# Patient Record
Sex: MALE | NOT HISPANIC OR LATINO | URBAN - METROPOLITAN AREA
[De-identification: names, ages, dates, MRNs, and addresses within clinical notes are randomized per-mention and may not be internally consistent; named-entity substitution may affect disease eponyms.]

---

## 2021-12-10 ENCOUNTER — OUTPATIENT (OUTPATIENT)
Dept: OUTPATIENT SERVICES | Facility: HOSPITAL | Age: 61
LOS: 1 days | End: 2021-12-10

## 2021-12-10 VITALS
TEMPERATURE: 97 F | RESPIRATION RATE: 16 BRPM | HEART RATE: 96 BPM | HEIGHT: 66 IN | DIASTOLIC BLOOD PRESSURE: 70 MMHG | WEIGHT: 149.91 LBS | OXYGEN SATURATION: 97 % | SYSTOLIC BLOOD PRESSURE: 110 MMHG

## 2021-12-10 DIAGNOSIS — Z98.890 OTHER SPECIFIED POSTPROCEDURAL STATES: Chronic | ICD-10-CM

## 2021-12-10 DIAGNOSIS — B20 HUMAN IMMUNODEFICIENCY VIRUS [HIV] DISEASE: ICD-10-CM

## 2021-12-10 DIAGNOSIS — N18.6 END STAGE RENAL DISEASE: ICD-10-CM

## 2021-12-10 DIAGNOSIS — E11.9 TYPE 2 DIABETES MELLITUS WITHOUT COMPLICATIONS: ICD-10-CM

## 2021-12-10 DIAGNOSIS — Z91.89 OTHER SPECIFIED PERSONAL RISK FACTORS, NOT ELSEWHERE CLASSIFIED: ICD-10-CM

## 2021-12-10 DIAGNOSIS — Z87.81 PERSONAL HISTORY OF (HEALED) TRAUMATIC FRACTURE: Chronic | ICD-10-CM

## 2021-12-10 DIAGNOSIS — J45.909 UNSPECIFIED ASTHMA, UNCOMPLICATED: ICD-10-CM

## 2021-12-10 DIAGNOSIS — Z95.828 PRESENCE OF OTHER VASCULAR IMPLANTS AND GRAFTS: Chronic | ICD-10-CM

## 2021-12-10 LAB
A1C WITH ESTIMATED AVERAGE GLUCOSE RESULT: 5.9 % — HIGH (ref 4–5.6)
ALBUMIN SERPL ELPH-MCNC: 4.1 G/DL — SIGNIFICANT CHANGE UP (ref 3.3–5)
ALP SERPL-CCNC: 103 U/L — SIGNIFICANT CHANGE UP (ref 40–120)
ALT FLD-CCNC: 13 U/L — SIGNIFICANT CHANGE UP (ref 4–41)
ANION GAP SERPL CALC-SCNC: 16 MMOL/L — HIGH (ref 7–14)
AST SERPL-CCNC: 28 U/L — SIGNIFICANT CHANGE UP (ref 4–40)
BILIRUB SERPL-MCNC: 0.7 MG/DL — SIGNIFICANT CHANGE UP (ref 0.2–1.2)
BUN SERPL-MCNC: 29 MG/DL — HIGH (ref 7–23)
CALCIUM SERPL-MCNC: 9.7 MG/DL — SIGNIFICANT CHANGE UP (ref 8.4–10.5)
CHLORIDE SERPL-SCNC: 92 MMOL/L — LOW (ref 98–107)
CO2 SERPL-SCNC: 29 MMOL/L — SIGNIFICANT CHANGE UP (ref 22–31)
CREAT SERPL-MCNC: 5.07 MG/DL — HIGH (ref 0.5–1.3)
ESTIMATED AVERAGE GLUCOSE: 123 — SIGNIFICANT CHANGE UP
GLUCOSE SERPL-MCNC: 81 MG/DL — SIGNIFICANT CHANGE UP (ref 70–99)
HCT VFR BLD CALC: 39.1 % — SIGNIFICANT CHANGE UP (ref 39–50)
HGB BLD-MCNC: 12.7 G/DL — LOW (ref 13–17)
MCHC RBC-ENTMCNC: 30.2 PG — SIGNIFICANT CHANGE UP (ref 27–34)
MCHC RBC-ENTMCNC: 32.5 GM/DL — SIGNIFICANT CHANGE UP (ref 32–36)
MCV RBC AUTO: 93.1 FL — SIGNIFICANT CHANGE UP (ref 80–100)
NRBC # BLD: 0 /100 WBCS — SIGNIFICANT CHANGE UP
NRBC # FLD: 0 K/UL — SIGNIFICANT CHANGE UP
PLATELET # BLD AUTO: 80 K/UL — LOW (ref 150–400)
POTASSIUM SERPL-MCNC: 4.1 MMOL/L — SIGNIFICANT CHANGE UP (ref 3.5–5.3)
POTASSIUM SERPL-SCNC: 4.1 MMOL/L — SIGNIFICANT CHANGE UP (ref 3.5–5.3)
PROT SERPL-MCNC: 9.2 G/DL — HIGH (ref 6–8.3)
RBC # BLD: 4.2 M/UL — SIGNIFICANT CHANGE UP (ref 4.2–5.8)
RBC # FLD: 15.6 % — HIGH (ref 10.3–14.5)
SODIUM SERPL-SCNC: 137 MMOL/L — SIGNIFICANT CHANGE UP (ref 135–145)
WBC # BLD: 5.23 K/UL — SIGNIFICANT CHANGE UP (ref 3.8–10.5)
WBC # FLD AUTO: 5.23 K/UL — SIGNIFICANT CHANGE UP (ref 3.8–10.5)

## 2021-12-10 RX ORDER — ALBUTEROL 90 UG/1
0 AEROSOL, METERED ORAL
Qty: 0 | Refills: 0 | DISCHARGE

## 2021-12-10 RX ORDER — TRAZODONE HCL 50 MG
1 TABLET ORAL
Qty: 0 | Refills: 0 | DISCHARGE

## 2021-12-10 RX ORDER — SODIUM CHLORIDE 9 MG/ML
3 INJECTION INTRAMUSCULAR; INTRAVENOUS; SUBCUTANEOUS ONCE
Refills: 0 | Status: DISCONTINUED | OUTPATIENT
Start: 2021-12-14 | End: 2021-12-28

## 2021-12-10 RX ORDER — SODIUM CHLORIDE 9 MG/ML
1000 INJECTION INTRAMUSCULAR; INTRAVENOUS; SUBCUTANEOUS
Refills: 0 | Status: DISCONTINUED | OUTPATIENT
Start: 2021-12-14 | End: 2021-12-28

## 2021-12-10 NOTE — H&P PST ADULT - HISTORY OF PRESENT ILLNESS
61 year old male with ESRD on dialysis since 8/2021, via chest wall dialysis catheter Matilde Ortega, Sat.  61 year old male with ESRD on dialysis since 8/2021, via chest wall dialysis catheter Tues, Thurs, Sat. presents today for presurgical evaluation for Left Forearm Arteriovenous Graft.     Pt poor historian.

## 2021-12-10 NOTE — H&P PST ADULT - NSICDXPASTSURGICALHX_GEN_ALL_CORE_FT
PAST SURGICAL HISTORY:  H/O fracture of arm ORIF left arm fracture    H/O hand surgery     History of ankle surgery left ORIF    S/P dialysis catheter insertion
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
## 2021-12-10 NOTE — H&P PST ADULT - PROBLEM SELECTOR PLAN 1
Pt scheduled for surgery on 12/14/21.  Pre-op instructions provided. Pt verbalized understanding.   Pt given detailed verbal and written instructions on chlorhexidine wash. Pt verbalized understanding with teachback.   Pt instructed to follow up with surgeon regarding preop COVID testing.  Pt went for medical and cardiac clearance per surgeon's request - requested copies (pt poor historian, IV drug abuse/HIV) Pt scheduled for surgery on 12/14/21.  Pre-op instructions provided. Pt verbalized understanding.   Pt given detailed verbal and written instructions on chlorhexidine wash. Pt verbalized understanding with teachback.   Pt instructed to follow up with surgeon regarding preop COVID testing.  Pt went for medical and cardiac clearance per surgeon's request - requested copies (pt poor historian, IV drug abuse/HIV/thrombocytopenia)

## 2021-12-10 NOTE — H&P PST ADULT - ATTENDING COMMENTS
On Hemodialysis with Rt IJ PC catheter.  For placement of AV graft as permanent venous access.  patient explained procedure and risks of surgery.  patient understands and agrees for surgery

## 2021-12-13 NOTE — ASU PATIENT PROFILE, ADULT - FALL HARM RISK - RISK INTERVENTIONS
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
## 2021-12-14 ENCOUNTER — OUTPATIENT (OUTPATIENT)
Dept: OUTPATIENT SERVICES | Facility: HOSPITAL | Age: 61
LOS: 1 days | Discharge: HOME | End: 2021-12-14

## 2021-12-14 VITALS
DIASTOLIC BLOOD PRESSURE: 90 MMHG | HEIGHT: 66 IN | WEIGHT: 149.91 LBS | HEART RATE: 81 BPM | SYSTOLIC BLOOD PRESSURE: 141 MMHG | RESPIRATION RATE: 15 BRPM | OXYGEN SATURATION: 97 % | TEMPERATURE: 98 F

## 2021-12-14 VITALS
OXYGEN SATURATION: 99 % | HEART RATE: 76 BPM | RESPIRATION RATE: 14 BRPM | DIASTOLIC BLOOD PRESSURE: 70 MMHG | SYSTOLIC BLOOD PRESSURE: 127 MMHG

## 2021-12-14 DIAGNOSIS — Z95.828 PRESENCE OF OTHER VASCULAR IMPLANTS AND GRAFTS: Chronic | ICD-10-CM

## 2021-12-14 DIAGNOSIS — Z98.890 OTHER SPECIFIED POSTPROCEDURAL STATES: Chronic | ICD-10-CM

## 2021-12-14 DIAGNOSIS — Z87.81 PERSONAL HISTORY OF (HEALED) TRAUMATIC FRACTURE: Chronic | ICD-10-CM

## 2021-12-14 DIAGNOSIS — N18.6 END STAGE RENAL DISEASE: ICD-10-CM

## 2021-12-14 RX ORDER — DOLUTEGRAVIR SODIUM 25 MG/1
1 TABLET, FILM COATED ORAL
Qty: 0 | Refills: 0 | DISCHARGE

## 2021-12-14 RX ORDER — ATORVASTATIN CALCIUM 80 MG/1
1 TABLET, FILM COATED ORAL
Qty: 0 | Refills: 0 | DISCHARGE

## 2021-12-14 RX ORDER — ATOVAQUONE 750 MG/5ML
5 SUSPENSION ORAL
Qty: 0 | Refills: 0 | DISCHARGE

## 2021-12-14 RX ORDER — OXYCODONE HYDROCHLORIDE 5 MG/1
1 TABLET ORAL
Qty: 0 | Refills: 0 | DISCHARGE

## 2021-12-14 RX ORDER — CALCIUM ACETATE 667 MG
3 TABLET ORAL
Qty: 0 | Refills: 0 | DISCHARGE

## 2021-12-14 RX ORDER — EMTRICITABINE AND TENOFOVIR DISOPROXIL FUMARATE 200; 300 MG/1; MG/1
1 TABLET, FILM COATED ORAL
Qty: 0 | Refills: 0 | DISCHARGE

## 2021-12-14 RX ORDER — TRAZODONE HCL 50 MG
1 TABLET ORAL
Qty: 0 | Refills: 0 | DISCHARGE

## 2021-12-14 RX ORDER — FLUTICASONE FUROATE AND VILANTEROL TRIFENATATE 100; 25 UG/1; UG/1
1 POWDER RESPIRATORY (INHALATION)
Qty: 0 | Refills: 0 | DISCHARGE

## 2021-12-14 RX ORDER — GABAPENTIN 400 MG/1
1 CAPSULE ORAL
Qty: 0 | Refills: 0 | DISCHARGE

## 2021-12-14 RX ORDER — INSULIN LISPRO 100/ML
4 VIAL (ML) SUBCUTANEOUS
Qty: 0 | Refills: 0 | DISCHARGE

## 2021-12-14 RX ORDER — ALBUTEROL 90 UG/1
2 AEROSOL, METERED ORAL
Qty: 0 | Refills: 0 | DISCHARGE

## 2021-12-14 RX ORDER — INSULIN GLARGINE 100 [IU]/ML
15 INJECTION, SOLUTION SUBCUTANEOUS
Qty: 0 | Refills: 0 | DISCHARGE

## 2021-12-14 RX ORDER — MEGESTROL ACETATE 40 MG/ML
20 SUSPENSION ORAL
Qty: 0 | Refills: 0 | DISCHARGE
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
## 2021-12-14 NOTE — ASU DISCHARGE PLAN (ADULT/PEDIATRIC) - CARE PROVIDER_API CALL
Vivek Kulkarni (MD)  Surgery  [address], [address]  [place] [address]  Phone: [phone]  Fax: [phone]  Established Patient  Follow Up Time: 2 weeks

## 2021-12-14 NOTE — ASU DISCHARGE PLAN (ADULT/PEDIATRIC) - ASU DC SPECIAL INSTRUCTIONSFT
Dialysis access was placed in your left upper extremity. Do not use for dialysis until Dr. Kulkarni has instructed you that it is ready to use.    For pain postoperatively, you can use tylenol over the counter. Dialysis access was placed in your left upper extremity. Do not use for dialysis until Dr. Kulkarni has instructed you that it is ready to use.    For pain postoperatively, you can use tylenol over the counter.    Use the sling for comfort until the anesthesia wears off. Dialysis access was placed in your left upper extremity. Do not use for dialysis until Dr. Kulkarni has instructed you that it is ready to use.    For pain postoperatively, you can use tylenol over the counter.    Use the sling for comfort until the anesthesia wears off.    Please call the office to schedule an appointment for 1 week to 10 days from today.

## 2021-12-14 NOTE — ASU DISCHARGE PLAN (ADULT/PEDIATRIC) - NS MD DC FALL RISK RISK
For information on Fall & Injury Prevention, visit: [email or web address] OR  [email or web address] OR  https://www.cdc.gov/steadi/patient.html
